# Patient Record
Sex: MALE | Race: OTHER | HISPANIC OR LATINO
[De-identification: names, ages, dates, MRNs, and addresses within clinical notes are randomized per-mention and may not be internally consistent; named-entity substitution may affect disease eponyms.]

---

## 2018-04-13 ENCOUNTER — APPOINTMENT (OUTPATIENT)
Dept: OPHTHALMOLOGY | Facility: CLINIC | Age: 58
End: 2018-04-13
Payer: COMMERCIAL

## 2018-04-13 PROBLEM — Z00.00 ENCOUNTER FOR PREVENTIVE HEALTH EXAMINATION: Status: ACTIVE | Noted: 2018-04-13

## 2018-04-13 PROCEDURE — 92133 CPTRZD OPH DX IMG PST SGM ON: CPT

## 2018-04-13 PROCEDURE — 92020 GONIOSCOPY: CPT

## 2018-04-13 PROCEDURE — 76514 ECHO EXAM OF EYE THICKNESS: CPT

## 2018-04-13 PROCEDURE — 99204 OFFICE O/P NEW MOD 45 MIN: CPT

## 2018-04-27 ENCOUNTER — APPOINTMENT (OUTPATIENT)
Dept: OPHTHALMOLOGY | Facility: CLINIC | Age: 58
End: 2018-04-27
Payer: COMMERCIAL

## 2018-04-27 PROCEDURE — 92083 EXTENDED VISUAL FIELD XM: CPT

## 2018-04-27 PROCEDURE — 92014 COMPRE OPH EXAM EST PT 1/>: CPT

## 2018-04-27 PROCEDURE — 92250 FUNDUS PHOTOGRAPHY W/I&R: CPT

## 2018-05-16 ENCOUNTER — TRANSCRIPTION ENCOUNTER (OUTPATIENT)
Age: 58
End: 2018-05-16

## 2018-05-25 ENCOUNTER — APPOINTMENT (OUTPATIENT)
Dept: OPHTHALMOLOGY | Facility: CLINIC | Age: 58
End: 2018-05-25
Payer: COMMERCIAL

## 2018-05-25 PROCEDURE — 92012 INTRM OPH EXAM EST PATIENT: CPT

## 2018-05-25 RX ORDER — TIMOLOL MALEATE 5 MG/ML
0.5 SOLUTION OPHTHALMIC
Qty: 3 | Refills: 3 | Status: ACTIVE | COMMUNITY
Start: 2018-04-13 | End: 1900-01-01

## 2018-05-25 RX ORDER — BRIMONIDINE TARTRATE 2 MG/MG
0.2 SOLUTION/ DROPS OPHTHALMIC
Qty: 3 | Refills: 3 | Status: ACTIVE | COMMUNITY
Start: 2018-04-27 | End: 1900-01-01

## 2018-07-27 ENCOUNTER — APPOINTMENT (OUTPATIENT)
Dept: OPHTHALMOLOGY | Facility: CLINIC | Age: 58
End: 2018-07-27
Payer: COMMERCIAL

## 2018-07-27 PROCEDURE — 92133 CPTRZD OPH DX IMG PST SGM ON: CPT

## 2018-07-27 PROCEDURE — 92012 INTRM OPH EXAM EST PATIENT: CPT

## 2018-11-05 RX ORDER — LATANOPROST/PF 0.005 %
0.01 DROPS OPHTHALMIC (EYE)
Qty: 3 | Refills: 3 | Status: ACTIVE | COMMUNITY
Start: 2018-04-13 | End: 1900-01-01

## 2018-11-30 ENCOUNTER — APPOINTMENT (OUTPATIENT)
Dept: OPHTHALMOLOGY | Facility: CLINIC | Age: 58
End: 2018-11-30
Payer: COMMERCIAL

## 2018-11-30 PROCEDURE — 92012 INTRM OPH EXAM EST PATIENT: CPT

## 2018-11-30 PROCEDURE — 92133 CPTRZD OPH DX IMG PST SGM ON: CPT

## 2018-11-30 RX ORDER — DORZOLAMIDE HYDROCHLORIDE TIMOLOL MALEATE 20; 5 MG/ML; MG/ML
22.3-6.8 SOLUTION/ DROPS OPHTHALMIC
Qty: 2 | Refills: 3 | Status: ACTIVE | COMMUNITY
Start: 2018-11-30 | End: 1900-01-01

## 2019-02-22 ENCOUNTER — APPOINTMENT (OUTPATIENT)
Dept: OPHTHALMOLOGY | Facility: CLINIC | Age: 59
End: 2019-02-22
Payer: COMMERCIAL

## 2019-02-22 DIAGNOSIS — H40.1411 CAPSULAR GLAUCOMA WITH PSEUDOEXFOLIATION OF LENS, RIGHT EYE, MILD STAGE: ICD-10-CM

## 2019-02-22 DIAGNOSIS — H40.1421 CAPSULAR GLAUCOMA WITH PSEUDOEXFOLIATION OF LENS, LEFT EYE, MILD STAGE: ICD-10-CM

## 2019-02-22 PROCEDURE — 92133 CPTRZD OPH DX IMG PST SGM ON: CPT

## 2019-02-22 PROCEDURE — 92012 INTRM OPH EXAM EST PATIENT: CPT

## 2019-04-30 ENCOUNTER — TRANSCRIPTION ENCOUNTER (OUTPATIENT)
Age: 59
End: 2019-04-30

## 2019-05-24 ENCOUNTER — APPOINTMENT (OUTPATIENT)
Dept: OPHTHALMOLOGY | Facility: CLINIC | Age: 59
End: 2019-05-24
Payer: COMMERCIAL

## 2019-05-24 ENCOUNTER — NON-APPOINTMENT (OUTPATIENT)
Age: 59
End: 2019-05-24

## 2019-05-24 PROCEDURE — 92083 EXTENDED VISUAL FIELD XM: CPT

## 2019-05-24 PROCEDURE — 92012 INTRM OPH EXAM EST PATIENT: CPT

## 2019-05-24 PROCEDURE — 92020 GONIOSCOPY: CPT

## 2019-06-27 ENCOUNTER — APPOINTMENT (OUTPATIENT)
Dept: OPHTHALMOLOGY | Facility: CLINIC | Age: 59
End: 2019-06-27
Payer: COMMERCIAL

## 2019-06-27 ENCOUNTER — OUTPATIENT (OUTPATIENT)
Dept: OUTPATIENT SERVICES | Facility: HOSPITAL | Age: 59
LOS: 1 days | End: 2019-06-27

## 2019-06-27 ENCOUNTER — APPOINTMENT (OUTPATIENT)
Dept: OPHTHALMOLOGY | Facility: CLINIC | Age: 59
End: 2019-06-27

## 2019-06-27 ENCOUNTER — NON-APPOINTMENT (OUTPATIENT)
Age: 59
End: 2019-06-27

## 2019-06-27 PROCEDURE — 65855 TRABECULOPLASTY LASER SURG: CPT | Mod: LT

## 2019-06-28 DIAGNOSIS — H40.1421 CAPSULAR GLAUCOMA WITH PSEUDOEXFOLIATION OF LENS, LEFT EYE, MILD STAGE: ICD-10-CM

## 2019-07-23 ENCOUNTER — APPOINTMENT (OUTPATIENT)
Dept: OPHTHALMOLOGY | Facility: CLINIC | Age: 59
End: 2019-07-23
Payer: COMMERCIAL

## 2019-07-23 ENCOUNTER — NON-APPOINTMENT (OUTPATIENT)
Age: 59
End: 2019-07-23

## 2019-07-23 PROCEDURE — 92012 INTRM OPH EXAM EST PATIENT: CPT

## 2019-11-22 ENCOUNTER — NON-APPOINTMENT (OUTPATIENT)
Age: 59
End: 2019-11-22

## 2019-11-22 ENCOUNTER — APPOINTMENT (OUTPATIENT)
Dept: OPHTHALMOLOGY | Facility: CLINIC | Age: 59
End: 2019-11-22
Payer: COMMERCIAL

## 2019-11-22 PROCEDURE — 92014 COMPRE OPH EXAM EST PT 1/>: CPT

## 2019-11-22 PROCEDURE — 92133 CPTRZD OPH DX IMG PST SGM ON: CPT

## 2020-04-24 ENCOUNTER — APPOINTMENT (OUTPATIENT)
Dept: OPHTHALMOLOGY | Facility: CLINIC | Age: 60
End: 2020-04-24
Payer: COMMERCIAL

## 2020-04-24 ENCOUNTER — NON-APPOINTMENT (OUTPATIENT)
Age: 60
End: 2020-04-24

## 2020-04-24 PROCEDURE — 99441: CPT

## 2020-09-01 ENCOUNTER — APPOINTMENT (OUTPATIENT)
Dept: OPHTHALMOLOGY | Facility: CLINIC | Age: 60
End: 2020-09-01
Payer: COMMERCIAL

## 2020-09-01 ENCOUNTER — NON-APPOINTMENT (OUTPATIENT)
Age: 60
End: 2020-09-01

## 2020-09-01 PROCEDURE — 92133 CPTRZD OPH DX IMG PST SGM ON: CPT

## 2020-09-01 PROCEDURE — 92014 COMPRE OPH EXAM EST PT 1/>: CPT

## 2021-01-15 ENCOUNTER — APPOINTMENT (OUTPATIENT)
Dept: OPHTHALMOLOGY | Facility: CLINIC | Age: 61
End: 2021-01-15
Payer: COMMERCIAL

## 2021-01-15 ENCOUNTER — NON-APPOINTMENT (OUTPATIENT)
Age: 61
End: 2021-01-15

## 2021-01-15 PROCEDURE — 92250 FUNDUS PHOTOGRAPHY W/I&R: CPT

## 2021-01-15 PROCEDURE — 92014 COMPRE OPH EXAM EST PT 1/>: CPT

## 2021-01-15 PROCEDURE — 99072 ADDL SUPL MATRL&STAF TM PHE: CPT

## 2021-01-15 PROCEDURE — 92020 GONIOSCOPY: CPT

## 2021-05-21 ENCOUNTER — APPOINTMENT (OUTPATIENT)
Dept: OPHTHALMOLOGY | Facility: CLINIC | Age: 61
End: 2021-05-21
Payer: COMMERCIAL

## 2021-05-21 ENCOUNTER — NON-APPOINTMENT (OUTPATIENT)
Age: 61
End: 2021-05-21

## 2021-05-21 PROCEDURE — 99072 ADDL SUPL MATRL&STAF TM PHE: CPT

## 2021-05-21 PROCEDURE — 92133 CPTRZD OPH DX IMG PST SGM ON: CPT

## 2021-05-21 PROCEDURE — 92014 COMPRE OPH EXAM EST PT 1/>: CPT

## 2021-09-17 ENCOUNTER — APPOINTMENT (OUTPATIENT)
Dept: OPHTHALMOLOGY | Facility: CLINIC | Age: 61
End: 2021-09-17
Payer: COMMERCIAL

## 2021-09-17 ENCOUNTER — NON-APPOINTMENT (OUTPATIENT)
Age: 61
End: 2021-09-17

## 2021-09-17 PROCEDURE — 92133 CPTRZD OPH DX IMG PST SGM ON: CPT

## 2021-09-17 PROCEDURE — 92014 COMPRE OPH EXAM EST PT 1/>: CPT

## 2022-01-14 ENCOUNTER — APPOINTMENT (OUTPATIENT)
Dept: OPHTHALMOLOGY | Facility: CLINIC | Age: 62
End: 2022-01-14
Payer: COMMERCIAL

## 2022-01-14 ENCOUNTER — NON-APPOINTMENT (OUTPATIENT)
Age: 62
End: 2022-01-14

## 2022-01-14 PROCEDURE — 92014 COMPRE OPH EXAM EST PT 1/>: CPT

## 2022-01-14 PROCEDURE — 92133 CPTRZD OPH DX IMG PST SGM ON: CPT

## 2022-05-20 ENCOUNTER — NON-APPOINTMENT (OUTPATIENT)
Age: 62
End: 2022-05-20

## 2022-05-20 ENCOUNTER — APPOINTMENT (OUTPATIENT)
Dept: OPHTHALMOLOGY | Facility: CLINIC | Age: 62
End: 2022-05-20
Payer: COMMERCIAL

## 2022-05-20 PROCEDURE — 92133 CPTRZD OPH DX IMG PST SGM ON: CPT

## 2022-05-20 PROCEDURE — 92014 COMPRE OPH EXAM EST PT 1/>: CPT

## 2022-09-02 ENCOUNTER — APPOINTMENT (OUTPATIENT)
Dept: OPHTHALMOLOGY | Facility: CLINIC | Age: 62
End: 2022-09-02

## 2022-09-02 ENCOUNTER — NON-APPOINTMENT (OUTPATIENT)
Age: 62
End: 2022-09-02

## 2022-09-02 PROCEDURE — 92133 CPTRZD OPH DX IMG PST SGM ON: CPT

## 2022-09-02 PROCEDURE — 92014 COMPRE OPH EXAM EST PT 1/>: CPT

## 2023-02-03 ENCOUNTER — APPOINTMENT (OUTPATIENT)
Dept: OPHTHALMOLOGY | Facility: CLINIC | Age: 63
End: 2023-02-03
Payer: COMMERCIAL

## 2023-02-03 ENCOUNTER — NON-APPOINTMENT (OUTPATIENT)
Age: 63
End: 2023-02-03

## 2023-02-03 PROCEDURE — 92014 COMPRE OPH EXAM EST PT 1/>: CPT

## 2023-02-03 PROCEDURE — 92133 CPTRZD OPH DX IMG PST SGM ON: CPT

## 2023-08-04 ENCOUNTER — APPOINTMENT (OUTPATIENT)
Dept: OPHTHALMOLOGY | Facility: CLINIC | Age: 63
End: 2023-08-04
Payer: COMMERCIAL

## 2023-08-04 ENCOUNTER — NON-APPOINTMENT (OUTPATIENT)
Age: 63
End: 2023-08-04

## 2023-08-04 PROCEDURE — 92133 CPTRZD OPH DX IMG PST SGM ON: CPT

## 2023-08-04 PROCEDURE — 92014 COMPRE OPH EXAM EST PT 1/>: CPT

## 2024-02-02 ENCOUNTER — APPOINTMENT (OUTPATIENT)
Dept: OPHTHALMOLOGY | Facility: CLINIC | Age: 64
End: 2024-02-02
Payer: COMMERCIAL

## 2024-02-02 ENCOUNTER — NON-APPOINTMENT (OUTPATIENT)
Age: 64
End: 2024-02-02

## 2024-02-02 PROCEDURE — 92014 COMPRE OPH EXAM EST PT 1/>: CPT

## 2024-02-02 PROCEDURE — 92133 CPTRZD OPH DX IMG PST SGM ON: CPT

## 2024-02-16 ENCOUNTER — APPOINTMENT (OUTPATIENT)
Dept: OPHTHALMOLOGY | Facility: CLINIC | Age: 64
End: 2024-02-16
Payer: COMMERCIAL

## 2024-02-16 ENCOUNTER — NON-APPOINTMENT (OUTPATIENT)
Age: 64
End: 2024-02-16

## 2024-02-16 PROCEDURE — 92136 OPHTHALMIC BIOMETRY: CPT

## 2024-02-16 PROCEDURE — 92012 INTRM OPH EXAM EST PATIENT: CPT

## 2024-04-19 ENCOUNTER — APPOINTMENT (OUTPATIENT)
Dept: OPHTHALMOLOGY | Facility: CLINIC | Age: 64
End: 2024-04-19
Payer: COMMERCIAL

## 2024-04-19 ENCOUNTER — NON-APPOINTMENT (OUTPATIENT)
Age: 64
End: 2024-04-19

## 2024-04-19 PROCEDURE — 92014 COMPRE OPH EXAM EST PT 1/>: CPT

## 2024-04-19 PROCEDURE — 92136 OPHTHALMIC BIOMETRY: CPT

## 2024-04-19 PROCEDURE — 92250 FUNDUS PHOTOGRAPHY W/I&R: CPT

## 2024-04-19 PROCEDURE — 92004 COMPRE OPH EXAM NEW PT 1/>: CPT

## 2024-04-19 PROCEDURE — 92025 CPTRIZED CORNEAL TOPOGRAPHY: CPT

## 2024-06-18 RX ORDER — ACETAMINOPHEN 500 MG
650 TABLET ORAL EVERY 6 HOURS
Refills: 0 | Status: DISCONTINUED | OUTPATIENT
Start: 2024-06-19 | End: 2024-06-19

## 2024-06-18 NOTE — ASU PATIENT PROFILE, ADULT - NS PREOP UNDERSTANDS INFO
No solid food/dairy/candy/gum after midnight; clears allowed before 0530am; reminded to come with photo ID/insurance card; escort to have a photo ID; dress in comfortable clothes; no jewelries/contact lens; no smoking/drinking alcohol/illicit drug use today; address and callback number was given/yes

## 2024-06-18 NOTE — ASU PATIENT PROFILE, ADULT - NSICDXPASTMEDICALHX_GEN_ALL_CORE_FT
PAST MEDICAL HISTORY:  Diabetes mellitus type 1    Glaucoma     High cholesterol     Hypertension

## 2024-06-19 ENCOUNTER — APPOINTMENT (OUTPATIENT)
Dept: OPHTHALMOLOGY | Facility: AMBULATORY SURGERY CENTER | Age: 64
End: 2024-06-19

## 2024-06-19 ENCOUNTER — OUTPATIENT (OUTPATIENT)
Dept: OUTPATIENT SERVICES | Facility: HOSPITAL | Age: 64
LOS: 1 days | Discharge: HOME | End: 2024-06-19
Payer: COMMERCIAL

## 2024-06-19 VITALS
RESPIRATION RATE: 16 BRPM | TEMPERATURE: 98 F | OXYGEN SATURATION: 96 % | SYSTOLIC BLOOD PRESSURE: 132 MMHG | DIASTOLIC BLOOD PRESSURE: 72 MMHG | HEART RATE: 68 BPM

## 2024-06-19 VITALS
SYSTOLIC BLOOD PRESSURE: 148 MMHG | HEART RATE: 77 BPM | DIASTOLIC BLOOD PRESSURE: 78 MMHG | OXYGEN SATURATION: 97 % | TEMPERATURE: 97 F | WEIGHT: 245.37 LBS | RESPIRATION RATE: 16 BRPM | HEIGHT: 72 IN

## 2024-06-19 LAB
GLUCOSE BLDC GLUCOMTR-MCNC: 127 MG/DL — HIGH (ref 70–99)
GLUCOSE BLDC GLUCOMTR-MCNC: 147 MG/DL — HIGH (ref 70–99)

## 2024-06-19 PROCEDURE — 66982 XCAPSL CTRC RMVL CPLX WO ECP: CPT | Mod: LT

## 2024-06-19 DEVICE — LENS IOL TECNIS EYHANCE DIB00 20.5D
Type: IMPLANTABLE DEVICE | Site: LEFT | Status: NON-FUNCTIONAL
Removed: 2024-06-19

## 2024-06-19 DEVICE — RING MALYUGIN STRL 7MM
Type: IMPLANTABLE DEVICE | Site: LEFT | Status: NON-FUNCTIONAL
Removed: 2024-06-19

## 2024-06-19 RX ORDER — OXYCODONE HYDROCHLORIDE 5 MG/1
5 TABLET ORAL ONCE
Refills: 0 | Status: DISCONTINUED | OUTPATIENT
Start: 2024-06-19 | End: 2024-06-19

## 2024-06-19 RX ORDER — SIMVASTATIN 20 MG/1
1 TABLET, FILM COATED ORAL
Refills: 0 | DISCHARGE

## 2024-06-19 RX ORDER — INSULIN DETEMIR 100/ML (3)
36 INSULIN PEN (ML) SUBCUTANEOUS
Refills: 0 | DISCHARGE

## 2024-06-19 RX ORDER — SODIUM CHLORIDE 9 MG/ML
1000 INJECTION, SOLUTION INTRAVENOUS
Refills: 0 | Status: DISCONTINUED | OUTPATIENT
Start: 2024-06-19 | End: 2024-06-19

## 2024-06-19 RX ORDER — INSULIN LISPRO 100/ML
12 VIAL (ML) SUBCUTANEOUS
Refills: 0 | DISCHARGE

## 2024-06-19 RX ORDER — INSULIN DETEMIR 100/ML (3)
17 INSULIN PEN (ML) SUBCUTANEOUS
Refills: 0 | DISCHARGE

## 2024-06-19 RX ORDER — ONDANSETRON 8 MG/1
4 TABLET, FILM COATED ORAL ONCE
Refills: 0 | Status: DISCONTINUED | OUTPATIENT
Start: 2024-06-19 | End: 2024-06-19

## 2024-06-19 RX ORDER — LATANOPROST 0.05 MG/ML
1 SOLUTION/ DROPS OPHTHALMIC; TOPICAL
Refills: 0 | DISCHARGE

## 2024-06-19 RX ORDER — CYCLOPENTOLATE HYDROCHLORIDE 10 MG/ML
1 SOLUTION/ DROPS OPHTHALMIC
Refills: 0 | Status: COMPLETED | OUTPATIENT
Start: 2024-06-19 | End: 2024-06-19

## 2024-06-19 RX ORDER — BRIMONIDINE TARTRATE 2 MG/MG
1 SOLUTION/ DROPS OPHTHALMIC
Refills: 0 | DISCHARGE

## 2024-06-19 RX ORDER — PHENYLEPHRINE HCL 2.5 %
1 DROPS OPHTHALMIC (EYE)
Refills: 0 | Status: COMPLETED | OUTPATIENT
Start: 2024-06-19 | End: 2024-06-19

## 2024-06-19 RX ORDER — DORZOLAMIDE HYDROCHLORIDE 20 MG/ML
1 SOLUTION/ DROPS OPHTHALMIC
Refills: 0 | DISCHARGE

## 2024-06-19 RX ORDER — TROPICAMIDE 1 %
1 DROPS OPHTHALMIC (EYE)
Refills: 0 | Status: COMPLETED | OUTPATIENT
Start: 2024-06-19 | End: 2024-06-19

## 2024-06-19 RX ORDER — OFLOXACIN 0.3 %
1 DROPS OPHTHALMIC (EYE)
Refills: 0 | Status: COMPLETED | OUTPATIENT
Start: 2024-06-19 | End: 2024-06-19

## 2024-06-19 RX ORDER — ACETAMINOPHEN 500 MG
650 TABLET ORAL ONCE
Refills: 0 | Status: DISCONTINUED | OUTPATIENT
Start: 2024-06-19 | End: 2024-06-19

## 2024-06-19 RX ADMIN — Medication 1 DROP(S): at 08:17

## 2024-06-19 RX ADMIN — Medication 1 DROP(S): at 07:56

## 2024-06-19 RX ADMIN — Medication 1 DROP(S): at 08:08

## 2024-06-19 RX ADMIN — Medication 1 DROP(S): at 08:07

## 2024-06-19 RX ADMIN — CYCLOPENTOLATE HYDROCHLORIDE 1 DROP(S): 10 SOLUTION/ DROPS OPHTHALMIC at 08:08

## 2024-06-19 RX ADMIN — Medication 1 DROP(S): at 08:16

## 2024-06-19 RX ADMIN — Medication 1 DROP(S): at 07:57

## 2024-06-19 RX ADMIN — CYCLOPENTOLATE HYDROCHLORIDE 1 DROP(S): 10 SOLUTION/ DROPS OPHTHALMIC at 08:17

## 2024-06-19 RX ADMIN — CYCLOPENTOLATE HYDROCHLORIDE 1 DROP(S): 10 SOLUTION/ DROPS OPHTHALMIC at 07:56

## 2024-06-19 RX ADMIN — SODIUM CHLORIDE 100 MILLILITER(S): 9 INJECTION, SOLUTION INTRAVENOUS at 10:59

## 2024-06-19 NOTE — OPERATIVE REPORT - OPERATIVE RPOSRT DETAILS
PATIENT:  CHERELLE FERNANDEZ	    OPERATING SURGEON:  Windy Norman MD       DATE OF SURGERY:  [  6/19/24   ]	    ANESTHESIA:  MAC/TOPICAL	    ESTIMATED BLOOD LOSS: < 1mL	    COMPLICATIONS:  [  none   ]		    PREOPERATIVE DIAGNOSIS:  Cataract, pseudoexfoliation, left eye    POSTOPERATIVE DIAGNOSIS:  Complex Cataract, pseudoexfoliation, left eye    OPERATIVE PROCEDURE:  Complex phacoemulsification with insertion of posterior chamber intraocular lens left eye    LENS IMPLANT: DIB00 +20.5 D    PROCEDURE:	The patient was brought into the operating room and placed supine on the operating room table. After uneventful induction of neuroleptic anesthesia, tetracaine was instilled into the operative eye achieving sufficient anesthesia.  The patient was then prepped and draped in the usual sterile fashion.  A wire lid speculum was then inserted into the operative eye giving a wide palpebral fissure.      A jessica knife was used to perform paracenteses through clear cornea at the 6 o’clock limbal position.  The anterior chamber was irrigated with lidocaine 1% nonpreserved and diluted nonpreserved epinephrine. Trypan blue was irrigated into the anterior chamber then washed out with BSS. Viscoelastic was then used to maintain the anterior chamber.  A keratome was used to create a clear corneal incision just inside the temporal limbus.  Poor pupillary dilation was noted so a malyugin ring was inserted. A bent 25 gauge needle was then used to initiate an anterior capsulorhexis. The anterior capsule was noted to be friable with rhexis extension peripherally at 6 oclock. The rhexis was completed with a combination of capsulorhexis forceps and 25G intraocular scissors.  Balanced salt solution was used to hydrodissect the nucleus.  The [employer]oon phacoemulsification unit was then used to completely phacoemulsify the nucleus, following which an aspirating handpiece was used to aspirate all cortical remnants from the capsular bag.  Viscoelastic was again used to reform the anterior chamber and capsular bag.      A new foldable posterior chamber intraocular lens was brought into the surgical field.  It was folded and directly injected into the capsular bag following which it was centered and secure.  The malyugin ring was removed. All viscoelastic was then aspirated from the anterior segment using an aspirating handpiece.  All wounds were checked for leaks and there were none. Combined steroid/antibiotic was used to irrigate the surface of the eye.  The lid speculum was removed from the eye and a shield placed over the eye.      The patient tolerated the procedure well and went to the recovery area in good condition.

## 2024-06-19 NOTE — ASU PREOP CHECKLIST - LAST TOOK
Activity and Rest  It is important to take care of yourself physically so you can handle the emotional work of grief.  · Sleep patterns can be disrupted due to grieving.  Rest when you can.  Call your doctor if sleeplessness continues past 2-3 weeks.  · No heavy lifting or excessive exercise until your postpartum visit.    Nutrition and Diet  · Regular diet, small frequent meals may be easier to tolerate.  · Increase protein for tissue repair, increase fiber, fluids, fresh fruit and vegetables.  · Consume 8-10 glasses of water a day.  · Grief can disrupt a normal diet.  Try fruit smoothies, protein drinks, or milk shakes when your appetite is diminished.  · No dieting until your doctor approves it, usually at 6 week check up.    Birth Control  No sexual relations until your care provider gives approval; usually at 6 week check up.  Discuss with your doctor which form of birth control is right for you.  Your doctor can best help you determine when the right time is to think about another pregnancy.    Breast Care and Suppression of Breast Milk  Having your milk come in can be quite upsetting; a difficult reminder that you don't have a baby to nurse.  There are a number of things you can do to help minimize this problem:  · Wear a tight bra or snug sports bra at all times for 7-10 days, even while sleeping.  · Use cold compresses on breasts for comfort.  Do not massage or hand express the breast.  This will only lead to more milk production.    Cabbage leaves have been used in some cultures for hundreds of years in the treatment of sprains and infections.  It is a popular method as a localized treatment for breast engorgement and to decrease milk production.  Most women find it to be very soothing to breasts.    · Sources state that green, ordinary (not Thai, etc) cabbage is preferable.  · Do not use cabbage leaves if you have an allergy or sensitivity to cabbage (or broccoli, cauliflower or brussell sprouts for  "that matter).  If a rash appears, immediately discontinue using cabbage leaves and call your health care provider.  · Wash the leaves thoroughly and pat dry.  · The veins can be crushed with a rolling pin or removed to allow them to be more form fitting to the breast.  · They can be chilled in the refrigerator as some feel they are more soothing cold, but it is not necessary.  · Place in a bra, wrapped around the breast with the nipple exposed.  · Leave on for 2 hours or until wilted and change to fresh leaves if you need to.  · Check to see how your breasts are responding with each change and stop using the leaves once engorgement is reduced and breast milk is suppressed.  · Be warned - cabbage leaves may stain your bra and can have a strong odor.      When to Call Your Physician  · Fever greater than 100.4; or a fever with chills.  · Heavy bleeding (more than 1 pad in an hour), or passing large clots.  · Foul smelling discharge - not the \"period\" smell.  · Severe or persistent pain in specific areas of abdomen, low back or legs.  · Painful or difficulty urinating.  · Increased pain, redness, swelling, tenderness or heat at incision site, and/or drainage or open area of incision or episiotomy.  · Localized redness, red streaks or severe pain on breast with fever.  · Signs and symptoms of depression (not eating, sleeping, suicidal thoughts, inability to stop crying, etc.) that does not improve over time.  " 06-  04:30  Black Coffee/clears

## 2024-06-20 ENCOUNTER — APPOINTMENT (OUTPATIENT)
Dept: OPHTHALMOLOGY | Facility: CLINIC | Age: 64
End: 2024-06-20
Payer: COMMERCIAL

## 2024-06-20 ENCOUNTER — APPOINTMENT (OUTPATIENT)
Dept: OPHTHALMOLOGY | Facility: CLINIC | Age: 64
End: 2024-06-20

## 2024-06-20 ENCOUNTER — NON-APPOINTMENT (OUTPATIENT)
Age: 64
End: 2024-06-20

## 2024-06-20 PROBLEM — H40.9 UNSPECIFIED GLAUCOMA: Chronic | Status: ACTIVE | Noted: 2024-06-18

## 2024-06-20 PROBLEM — I10 ESSENTIAL (PRIMARY) HYPERTENSION: Chronic | Status: ACTIVE | Noted: 2024-06-18

## 2024-06-20 PROCEDURE — 99024 POSTOP FOLLOW-UP VISIT: CPT

## 2024-06-24 ENCOUNTER — NON-APPOINTMENT (OUTPATIENT)
Age: 64
End: 2024-06-24

## 2024-06-24 ENCOUNTER — APPOINTMENT (OUTPATIENT)
Dept: OPHTHALMOLOGY | Facility: CLINIC | Age: 64
End: 2024-06-24
Payer: COMMERCIAL

## 2024-06-24 PROCEDURE — 99024 POSTOP FOLLOW-UP VISIT: CPT

## 2024-06-28 ENCOUNTER — NON-APPOINTMENT (OUTPATIENT)
Age: 64
End: 2024-06-28

## 2024-06-28 ENCOUNTER — APPOINTMENT (OUTPATIENT)
Dept: OPHTHALMOLOGY | Facility: CLINIC | Age: 64
End: 2024-06-28
Payer: COMMERCIAL

## 2024-06-28 PROBLEM — E11.9 TYPE 2 DIABETES MELLITUS WITHOUT COMPLICATIONS: Chronic | Status: ACTIVE | Noted: 2024-06-18

## 2024-06-28 PROBLEM — E78.00 PURE HYPERCHOLESTEROLEMIA, UNSPECIFIED: Chronic | Status: ACTIVE | Noted: 2024-06-18

## 2024-06-28 PROCEDURE — 99024 POSTOP FOLLOW-UP VISIT: CPT

## 2024-07-12 ENCOUNTER — NON-APPOINTMENT (OUTPATIENT)
Age: 64
End: 2024-07-12

## 2024-07-12 ENCOUNTER — APPOINTMENT (OUTPATIENT)
Dept: OPHTHALMOLOGY | Facility: CLINIC | Age: 64
End: 2024-07-12
Payer: COMMERCIAL

## 2024-07-12 PROCEDURE — 99024 POSTOP FOLLOW-UP VISIT: CPT

## 2024-08-02 ENCOUNTER — NON-APPOINTMENT (OUTPATIENT)
Age: 64
End: 2024-08-02

## 2024-08-02 ENCOUNTER — APPOINTMENT (OUTPATIENT)
Dept: OPHTHALMOLOGY | Facility: CLINIC | Age: 64
End: 2024-08-02
Payer: COMMERCIAL

## 2024-08-02 PROCEDURE — 99024 POSTOP FOLLOW-UP VISIT: CPT

## 2024-10-11 ENCOUNTER — NON-APPOINTMENT (OUTPATIENT)
Age: 64
End: 2024-10-11

## 2024-10-11 ENCOUNTER — APPOINTMENT (OUTPATIENT)
Dept: OPHTHALMOLOGY | Facility: CLINIC | Age: 64
End: 2024-10-11
Payer: COMMERCIAL

## 2024-10-11 PROCEDURE — 92014 COMPRE OPH EXAM EST PT 1/>: CPT

## 2024-10-11 PROCEDURE — 92133 CPTRZD OPH DX IMG PST SGM ON: CPT

## 2024-12-17 ENCOUNTER — NON-APPOINTMENT (OUTPATIENT)
Age: 64
End: 2024-12-17

## 2024-12-17 ENCOUNTER — APPOINTMENT (OUTPATIENT)
Dept: OPHTHALMOLOGY | Facility: CLINIC | Age: 64
End: 2024-12-17
Payer: COMMERCIAL

## 2024-12-17 PROCEDURE — 92014 COMPRE OPH EXAM EST PT 1/>: CPT

## 2024-12-17 PROCEDURE — 92133 CPTRZD OPH DX IMG PST SGM ON: CPT

## 2025-01-09 ENCOUNTER — APPOINTMENT (OUTPATIENT)
Dept: OPHTHALMOLOGY | Facility: CLINIC | Age: 65
End: 2025-01-09
Payer: COMMERCIAL

## 2025-01-09 ENCOUNTER — NON-APPOINTMENT (OUTPATIENT)
Age: 65
End: 2025-01-09

## 2025-01-09 ENCOUNTER — OUTPATIENT (OUTPATIENT)
Dept: OUTPATIENT SERVICES | Facility: HOSPITAL | Age: 65
LOS: 1 days | End: 2025-01-09

## 2025-01-09 PROCEDURE — 65855 TRABECULOPLASTY LASER SURG: CPT | Mod: LT

## 2025-01-13 DIAGNOSIS — H40.1421 CAPSULAR GLAUCOMA WITH PSEUDOEXFOLIATION OF LENS, LEFT EYE, MILD STAGE: ICD-10-CM

## 2025-01-24 ENCOUNTER — APPOINTMENT (OUTPATIENT)
Dept: OPHTHALMOLOGY | Facility: CLINIC | Age: 65
End: 2025-01-24
Payer: COMMERCIAL

## 2025-01-24 ENCOUNTER — NON-APPOINTMENT (OUTPATIENT)
Age: 65
End: 2025-01-24

## 2025-01-24 PROCEDURE — 92012 INTRM OPH EXAM EST PATIENT: CPT

## 2025-03-07 ENCOUNTER — NON-APPOINTMENT (OUTPATIENT)
Age: 65
End: 2025-03-07

## 2025-03-07 ENCOUNTER — APPOINTMENT (OUTPATIENT)
Dept: OPHTHALMOLOGY | Facility: CLINIC | Age: 65
End: 2025-03-07
Payer: COMMERCIAL

## 2025-03-07 PROCEDURE — 92012 INTRM OPH EXAM EST PATIENT: CPT

## 2025-05-09 ENCOUNTER — NON-APPOINTMENT (OUTPATIENT)
Age: 65
End: 2025-05-09

## 2025-05-09 ENCOUNTER — APPOINTMENT (OUTPATIENT)
Dept: OPHTHALMOLOGY | Facility: CLINIC | Age: 65
End: 2025-05-09
Payer: COMMERCIAL

## 2025-05-09 PROCEDURE — 92014 COMPRE OPH EXAM EST PT 1/>: CPT

## 2025-05-09 PROCEDURE — 92133 CPTRZD OPH DX IMG PST SGM ON: CPT

## 2025-09-12 ENCOUNTER — APPOINTMENT (OUTPATIENT)
Dept: OPHTHALMOLOGY | Facility: CLINIC | Age: 65
End: 2025-09-12
Payer: COMMERCIAL

## 2025-09-12 ENCOUNTER — NON-APPOINTMENT (OUTPATIENT)
Age: 65
End: 2025-09-12

## 2025-09-12 PROCEDURE — 92014 COMPRE OPH EXAM EST PT 1/>: CPT

## 2025-09-12 PROCEDURE — 92133 CPTRZD OPH DX IMG PST SGM ON: CPT

## (undated) DEVICE — SOL IRR BAL SALT 500ML

## (undated) DEVICE — NDL HYPO SAFE 25G X 5/8" (ORANGE)

## (undated) DEVICE — KIT CENTURION ANTERIOR

## (undated) DEVICE — CANNULA IRR ANT CHAMBER 30G

## (undated) DEVICE — PACK CENTURION 2.4MM

## (undated) DEVICE — SUT NYLON 10-0 12" CU-5

## (undated) DEVICE — GOWN ROYAL SILK XL

## (undated) DEVICE — DRAPE MICROSCOPE KNOB COVER SMALL (2 PCS)

## (undated) DEVICE — GLV 7.5 PROTEXIS (WHITE)

## (undated) DEVICE — KNIFE ALCON PARACENTESIS CLEARCUT SIDEPORT 1MM (YELLOW)

## (undated) DEVICE — NDL RETROBULBAR VISITEC 25X1.5

## (undated) DEVICE — DRSG CURITY GAUZE SPONGE 4 X 4" 12-PLY

## (undated) DEVICE — INSTR GRIESHABER REVOLUTION SCISSOR 25G (CURVED) DISP

## (undated) DEVICE — PACK ANTERIOR SEGMENT